# Patient Record
Sex: MALE | Race: WHITE | Employment: FULL TIME | URBAN - METROPOLITAN AREA
[De-identification: names, ages, dates, MRNs, and addresses within clinical notes are randomized per-mention and may not be internally consistent; named-entity substitution may affect disease eponyms.]

---

## 2020-03-05 ENCOUNTER — HOSPITAL ENCOUNTER (EMERGENCY)
Age: 40
Discharge: HOME | End: 2020-03-05
Attending: EMERGENCY MEDICINE
Payer: MEDICAID

## 2020-03-05 VITALS
BODY MASS INDEX: 35.21 KG/M2 | TEMPERATURE: 99.2 F | RESPIRATION RATE: 19 BRPM | SYSTOLIC BLOOD PRESSURE: 146 MMHG | HEART RATE: 98 BPM | DIASTOLIC BLOOD PRESSURE: 100 MMHG | HEIGHT: 72 IN | WEIGHT: 260 LBS | OXYGEN SATURATION: 98 %

## 2020-03-05 DIAGNOSIS — J02.9 ACUTE PHARYNGITIS, UNSPECIFIED ETIOLOGY: Primary | ICD-10-CM

## 2020-03-05 LAB
FLUAV AG NPH QL IA: NEGATIVE
FLUBV AG NOSE QL IA: NEGATIVE

## 2020-03-05 PROCEDURE — 87081 CULTURE SCREEN ONLY: CPT

## 2020-03-05 PROCEDURE — 99282 EMERGENCY DEPT VISIT SF MDM: CPT

## 2020-03-05 PROCEDURE — 87804 INFLUENZA ASSAY W/OPTIC: CPT

## 2020-03-05 PROCEDURE — 87077 CULTURE AEROBIC IDENTIFY: CPT

## 2020-03-05 RX ORDER — NAPROXEN 500 MG/1
500 TABLET ORAL 2 TIMES DAILY WITH MEALS
Qty: 20 TAB | Refills: 0 | Status: SHIPPED | OUTPATIENT
Start: 2020-03-05 | End: 2020-06-30

## 2020-03-05 RX ORDER — AZITHROMYCIN 250 MG/1
TABLET, FILM COATED ORAL
Qty: 6 TAB | Refills: 0 | Status: SHIPPED | OUTPATIENT
Start: 2020-03-05 | End: 2020-03-10

## 2020-03-05 NOTE — LETTER
NOTIFICATION RETURN TO WORK / SCHOOL 
 
3/5/2020 6:23 PM 
 
Mr. Dagmar Carrel 
[address] [place] [address] To Whom It May Concern: 
 
Dagmar Carrel is currently under the care of [id number] [facility] EMERGENCY DEPT. He will return to work/school on: 3/9/2020 Dagmar Carrel may return to work/school with the following restrictions: none. If there are questions or concerns please have the patient contact our office.  
 
 
 
Sincerely, 
 
 
 
 
 
Garrett Hamm RN

## 2020-03-05 NOTE — ED TRIAGE NOTES
Patient states he's had a cough \"for a while\" that worsened yesterday.  Today he woke up with sore throat

## 2020-03-05 NOTE — ED PROVIDER NOTES
EMERGENCY DEPARTMENT HISTORY AND PHYSICAL EXAM    4:48 PM      Date: 3/5/2020  Patient Name: Omid Levy    History of Presenting Illness     Chief Complaint   Patient presents with    Sore Throat    Cough         History Provided By: Patient    Additional History (Context): Omid Levy is a 45 y.o. male with No significant past medical history who presents with shortness of breath cough since this morning. Patient also complains of soreness of throat. Cough is non productive. Pt denies fever, nausea, vomiting or diarrhea. Pt has sick contact. Denies etoh, or recreational drug use. Smokes 1ppd. PCP: UNKNOWN        Past History     Past Medical History:  History reviewed. No pertinent past medical history. Past Surgical History:  History reviewed. No pertinent surgical history. Family History:  History reviewed. No pertinent family history. Social History:  Social History     Tobacco Use    Smoking status: Current Every Day Smoker     Packs/day: 1.00    Smokeless tobacco: Never Used   Substance Use Topics    Alcohol use: Not Currently    Drug use: Not on file       Allergies: Allergies   Allergen Reactions    Penicillins Anaphylaxis         Review of Systems       Review of Systems   Constitutional: Negative. Negative for chills, diaphoresis and fever. HENT: Negative. Negative for congestion, rhinorrhea and sore throat. Eyes: Negative. Negative for pain, discharge and redness. Respiratory: Negative. Negative for cough, chest tightness, shortness of breath and wheezing. Cardiovascular: Negative. Negative for chest pain. Gastrointestinal: Negative. Negative for abdominal pain, constipation, diarrhea, nausea and vomiting. Genitourinary: Negative. Negative for dysuria, flank pain, frequency, hematuria and urgency. Musculoskeletal: Negative. Negative for back pain and neck pain. Skin: Negative. Negative for rash. Neurological: Negative.   Negative for syncope, weakness, numbness and headaches. Psychiatric/Behavioral: Negative. All other systems reviewed and are negative. Physical Exam     Visit Vitals  BP (!) 146/100 (BP 1 Location: Left arm, BP Patient Position: At rest)   Pulse 98   Temp 99.2 °F (37.3 °C)   Resp 19   Ht 6' (1.829 m)   Wt 117.9 kg (260 lb)   SpO2 98%   BMI 35.26 kg/m²         Physical Exam  Vitals signs and nursing note reviewed. Constitutional:       General: He is not in acute distress. Appearance: Normal appearance. He is well-developed. He is ill-appearing. He is not toxic-appearing or diaphoretic. HENT:      Head: Normocephalic and atraumatic. Mouth/Throat:      Pharynx: Pharyngeal swelling and posterior oropharyngeal erythema present. No oropharyngeal exudate. Eyes:      General: No scleral icterus. Conjunctiva/sclera: Conjunctivae normal.      Pupils: Pupils are equal, round, and reactive to light. Neck:      Musculoskeletal: Normal range of motion and neck supple. Thyroid: No thyromegaly. Vascular: No hepatojugular reflux or JVD. Trachea: No tracheal deviation. Cardiovascular:      Rate and Rhythm: Normal rate and regular rhythm. Pulses: Normal pulses. Radial pulses are 2+ on the right side and 2+ on the left side. Dorsalis pedis pulses are 2+ on the right side and 2+ on the left side. Heart sounds: Normal heart sounds, S1 normal and S2 normal. No murmur. No gallop. No S3 or S4 sounds. Pulmonary:      Effort: Pulmonary effort is normal. No respiratory distress. Breath sounds: Normal breath sounds. No decreased breath sounds, wheezing, rhonchi or rales. Abdominal:      General: Bowel sounds are normal. There is no distension. Palpations: Abdomen is soft. Abdomen is not rigid. There is no mass. Tenderness: There is no abdominal tenderness. There is no guarding or rebound. Negative signs include Dominguez's sign and McBurney's sign.    Musculoskeletal: Normal range of motion. Lymphadenopathy:      Head:      Right side of head: No submental, submandibular, preauricular or occipital adenopathy. Left side of head: No submental, submandibular, preauricular or occipital adenopathy. Cervical: No cervical adenopathy. Upper Body:      Right upper body: No supraclavicular adenopathy. Left upper body: No supraclavicular adenopathy. Skin:     General: Skin is warm and dry. Findings: No rash. Neurological:      Mental Status: He is alert. He is not disoriented. GCS: GCS eye subscore is 4. GCS verbal subscore is 5. GCS motor subscore is 6. Cranial Nerves: No cranial nerve deficit. Sensory: No sensory deficit. Coordination: Coordination normal.      Gait: Gait normal.      Deep Tendon Reflexes: Reflexes are normal and symmetric. Psychiatric:         Speech: Speech normal.         Behavior: Behavior normal.         Thought Content: Thought content normal.         Judgment: Judgment normal.           Diagnostic Study Results     Labs -  Recent Results (from the past 12 hour(s))   STREP THROAT SCREEN    Collection Time: 03/05/20  4:52 PM   Result Value Ref Range    Special Requests: NO SPECIAL REQUESTS      Strep Screen NEGATIVE       Culture result: PENDING    INFLUENZA A & B AG (RAPID TEST)    Collection Time: 03/05/20  4:52 PM   Result Value Ref Range    Influenza A Antigen NEGATIVE  NEG      Influenza B Antigen NEGATIVE  NEG         Radiologic Studies -   No orders to display         Medical Decision Making   Provider Notes (Medical Decision Making):  MDM  Number of Diagnoses or Management Options  Acute pharyngitis, unspecified etiology:   Diagnosis management comments: Viral syndrome       I am the first provider for this patient. I reviewed the vital signs, available nursing notes, past medical history, past surgical history, family history and social history.     Vital Signs-Reviewed the patient's vital signs. Records Reviewed: Nursing Notes (Time of Review: 4:48 PM)    ED Course: Progress Notes, Reevaluation, and Consults:    Strep throat negative. Influenza negative. 6:08 PM 3/5/2020        Diagnosis       I have reassessed the patient. Patient is feeling well. Patient will be prescribed Azithromycin and Naprosyn. Patient was discharged in stable condition. Patient is to return to emergency department if any new or worsening condition. Clinical Impression:   1. Acute pharyngitis, unspecified etiology        Disposition: Discharged home     Follow-up Information     Follow up With Specialties Details Why [address]  In 2 days  [address]  [facility] [address] [place] [address] [address]             Attestation        Provider Attestation:     I personally performed the services described in the documentation, reviewed the documentation and it accurately and completely records my words and actions utilizing the [address] [place] March 05, 2020 at 6:34 PM - [place], [address]. It is dictated using utilizing voice recognition software. Unfortunately this leads to occasional typographical errors. I apologize in advance if the situation occurs. If questions arise please do not hesitate to contact me or call our department.

## 2020-03-05 NOTE — DISCHARGE INSTRUCTIONS

## 2020-03-06 LAB
B-HEM STREP THROAT QL CULT: NEGATIVE
BACTERIA SPEC CULT: ABNORMAL
BACTERIA SPEC CULT: ABNORMAL
SERVICE CMNT-IMP: ABNORMAL

## 2020-06-30 ENCOUNTER — HOSPITAL ENCOUNTER (EMERGENCY)
Age: 40
Discharge: HOME | End: 2020-06-30
Attending: EMERGENCY MEDICINE
Payer: MEDICAID

## 2020-06-30 VITALS
OXYGEN SATURATION: 95 % | DIASTOLIC BLOOD PRESSURE: 103 MMHG | RESPIRATION RATE: 16 BRPM | WEIGHT: 285 LBS | HEIGHT: 72 IN | TEMPERATURE: 98.4 F | SYSTOLIC BLOOD PRESSURE: 146 MMHG | HEART RATE: 89 BPM | BODY MASS INDEX: 38.6 KG/M2

## 2020-06-30 DIAGNOSIS — J30.9 ALLERGIC RHINITIS, UNSPECIFIED SEASONALITY, UNSPECIFIED TRIGGER: Primary | ICD-10-CM

## 2020-06-30 PROCEDURE — 99282 EMERGENCY DEPT VISIT SF MDM: CPT

## 2020-06-30 RX ORDER — CETIRIZINE HYDROCHLORIDE 10 MG/1
1 CAPSULE, LIQUID FILLED ORAL DAILY
Qty: 30 CAP | Refills: 0 | Status: SHIPPED | OUTPATIENT
Start: 2020-06-30 | End: 2020-07-30

## 2020-06-30 NOTE — LETTER
NOTIFICATION RETURN TO WORK / SCHOOL 
 
6/30/2020 4:44 PM 
 
Mr. Jessica Mooney 
[address] [place] [address] To Whom It May Concern: 
 
Jessica Mooney is currently under the care of [id number] [place] [address] EMERGENCY DEPT. He will return to work/school on: 7-1-2020 If there are questions or concerns please have the patient contact our office. Sincerely, Mal RIVERA, RICHAC

## 2020-06-30 NOTE — ED PROVIDER NOTES
[facility]  Emergency Department Treatment Report        4:43 PM Alis Lockett is a 45 y.o. male with a history of smoking cigarettes a pack per day who presents to ED with fullness in his ears runny nose congestion. Patient denies any fever chest pain or shortness of breath. He has not taken anything for this no sinus pressure or sinus pain. No other complaints, associated symptoms or modifying factors at this time. PCP: UNKNOWN      The history is provided by the patient. No  was used. Nasal Congestion   This is a new problem. The current episode started 2 days ago. The problem has not changed since onset. Pertinent negatives include no chest pain, no abdominal pain and no shortness of breath. Nothing aggravates the symptoms. Nothing relieves the symptoms. He has tried nothing for the symptoms. Past Medical History:   Diagnosis Date    Asthma     Hearing loss     left ear       Past Surgical History:   Procedure Laterality Date    HX HEENT      nasal surgery to repair nasal fracture         History reviewed. No pertinent family history.     Social History     Socioeconomic History    Marital status:      Spouse name: Not on file    Number of children: Not on file    Years of education: Not on file    Highest education level: Not on file   Occupational History    Not on file   Social Needs    Financial resource strain: Not on file    Food insecurity     Worry: Not on file     Inability: Not on file    Transportation needs     Medical: Not on file     Non-medical: Not on file   Tobacco Use    Smoking status: Current Every Day Smoker     Packs/day: 1.00    Smokeless tobacco: Never Used   Substance and Sexual Activity    Alcohol use: Not Currently    Drug use: Never    Sexual activity: Not on file   Lifestyle    Physical activity     Days per week: Not on file     Minutes per session: Not on file    Stress: Not on file   Relationships    Social connections     Talks on phone: Not on file     Gets together: Not on file     Attends Rastafari service: Not on file     Active member of club or organization: Not on file     Attends meetings of clubs or organizations: Not on file     Relationship status: Not on file    Intimate partner violence     Fear of current or ex partner: Not on file     Emotionally abused: Not on file     Physically abused: Not on file     Forced sexual activity: Not on file   Other Topics Concern    Not on file   Social History Narrative    Not on file         ALLERGIES: Penicillins    Review of Systems   Constitutional: Negative for fever. HENT: Positive for congestion. Negative for ear pain, sinus pressure and sinus pain. Respiratory: Negative for chest tightness and shortness of breath. Cardiovascular: Negative for chest pain. Gastrointestinal: Negative for abdominal pain. Neurological: Negative for dizziness. All other systems reviewed and are negative. Vitals:    06/30/20 1524   BP: (!) 146/103   Pulse: 89   Resp: 16   Temp: 98.4 °F (36.9 °C)   SpO2: 95%   Weight: 129.3 kg (285 lb)   Height: 6' (1.829 m)            Physical Exam  Vitals signs and nursing note reviewed. Constitutional:       Appearance: He is well-developed. HENT:      Head: Normocephalic and atraumatic. Right Ear: Tympanic membrane is retracted. Left Ear: Tympanic membrane is retracted. Nose: Mucosal edema present. Right Sinus: No maxillary sinus tenderness or frontal sinus tenderness. Left Sinus: No maxillary sinus tenderness or frontal sinus tenderness. Mouth/Throat:      Lips: Pink. Mouth: Mucous membranes are moist.      Pharynx: Oropharynx is clear. No pharyngeal swelling, oropharyngeal exudate, posterior oropharyngeal erythema or uvula swelling. Eyes:      Conjunctiva/sclera: Conjunctivae normal.      Pupils: Pupils are equal, round, and reactive to light.    Neck:      Musculoskeletal: Normal range of motion and neck supple. Cardiovascular:      Rate and Rhythm: Normal rate and regular rhythm. Pulmonary:      Effort: Pulmonary effort is normal.      Breath sounds: Normal breath sounds. Abdominal:      General: Bowel sounds are normal.      Palpations: Abdomen is soft. Musculoskeletal: Normal range of motion. Skin:     General: Skin is warm and dry. Neurological:      Mental Status: He is alert and oriented to person, place, and time. Deep Tendon Reflexes: Reflexes are normal and symmetric. Psychiatric:         Behavior: Behavior normal.         Thought Content: Thought content normal.         Judgment: Judgment normal.          MDM  Number of Diagnoses or Management Options  Allergic rhinitis, unspecified seasonality, unspecified trigger: minor  Diagnosis management comments: Is consistent with allergic rhinitis suspect any other acute illness I will start patient on Zyrtec for allergic rhinitis. I also advised patient to stop smoking is probably making the allergic rhinitis worse. No other acute illnesses suspected patient discharged home in no distress. Work Note provided as requested per patient       Amount and/or Complexity of Data Reviewed  Review and summarize past medical records: yes  Independent visualization of images, tracings, or specimens: yes    Risk of Complications, Morbidity, and/or Mortality  Presenting problems: low  Diagnostic procedures: low  Management options: low    Patient Progress  Patient progress: stable         Procedures            Vitals:  Patient Vitals for the past 12 hrs:   Temp Pulse Resp BP SpO2   06/30/20 1524 98.4 °F (36.9 °C) 89 16 (!) 146/103 95 %          Disposition:    Diagnosis:   1.  Allergic rhinitis, unspecified seasonality, unspecified trigger        Disposition: to Home            Follow-up Information     Follow up With Specialties Details Why [address]  In 2 days  [address] [place] [address]  [phone]           Discharge Medication List as of 6/30/2020  4:42 PM      START taking these medications    Details   Cetirizine (ZyrTEC) 10 mg cap Take 1 Tab by mouth daily for 30 days. , Print, Disp-30 Cap, R-0             Return to the ER if you are unable to obtain referral as directed. Shell Slaughter  results have been reviewed with him. He has been counseled regarding his diagnosis, treatment, and plan. He verbally conveys understanding and agreement of the signs, symptoms, diagnosis, treatment and prognosis and additionally agrees to follow up as discussed. He also agrees with the care-plan and conveys that all of his questions have been answered. I have also provided discharge instructions for him that include: educational information regarding their diagnosis and treatment, and list of reasons why they would want to return to the ED prior to their follow-up appointment, should his condition change. Toño Falk ENP-C,FNP-C      Dragon voice recognition was used to generate this report, which may have resulted in some phonetic based errors in grammar and contents.  Even though attempts were made to correct all the mistakes, some may have been missed, and remained in the body of the document